# Patient Record
Sex: FEMALE | Race: NATIVE HAWAIIAN OR OTHER PACIFIC ISLANDER | NOT HISPANIC OR LATINO | Employment: PART TIME | URBAN - METROPOLITAN AREA
[De-identification: names, ages, dates, MRNs, and addresses within clinical notes are randomized per-mention and may not be internally consistent; named-entity substitution may affect disease eponyms.]

---

## 2019-08-11 DIAGNOSIS — M79.671 ACUTE PAIN OF RIGHT FOOT: Primary | ICD-10-CM

## 2019-08-13 ENCOUNTER — HOSPITAL ENCOUNTER (OUTPATIENT)
Dept: RADIOLOGY | Facility: HOSPITAL | Age: 59
Discharge: HOME | End: 2019-08-13
Attending: PODIATRIST
Payer: COMMERCIAL

## 2019-08-13 DIAGNOSIS — M79.671 ACUTE PAIN OF RIGHT FOOT: ICD-10-CM

## 2019-08-13 PROCEDURE — 73630 X-RAY EXAM OF FOOT: CPT

## 2019-09-26 ENCOUNTER — OFFICE VISIT (OUTPATIENT)
Dept: PODIATRY | Facility: CLINIC | Age: 59
End: 2019-09-26
Payer: COMMERCIAL

## 2019-09-26 VITALS
WEIGHT: 152.8 LBS | BODY MASS INDEX: 26.09 KG/M2 | DIASTOLIC BLOOD PRESSURE: 82 MMHG | SYSTOLIC BLOOD PRESSURE: 129 MMHG | HEIGHT: 64 IN | HEART RATE: 65 BPM

## 2019-09-26 DIAGNOSIS — B35.3 TINEA PEDIS OF RIGHT FOOT: ICD-10-CM

## 2019-09-26 DIAGNOSIS — M24.874 OTHER SPECIFIC JOINT DERANGEMENTS OF RIGHT FOOT, NOT ELSEWHERE CLASSIFIED: Primary | ICD-10-CM

## 2019-09-26 DIAGNOSIS — M25.571 SINUS TARSI SYNDROME, RIGHT: ICD-10-CM

## 2019-09-26 PROCEDURE — 99203 OFFICE O/P NEW LOW 30 MIN: CPT | Performed by: PODIATRIST

## 2019-09-26 PROCEDURE — 20600 DRAIN/INJ JOINT/BURSA W/O US: CPT | Performed by: PODIATRIST

## 2019-09-26 RX ORDER — CHLORAL HYDRATE 500 MG
2 CAPSULE ORAL 2 TIMES DAILY
COMMUNITY

## 2019-09-26 RX ORDER — LIDOCAINE HYDROCHLORIDE 10 MG/ML
1 INJECTION, SOLUTION INFILTRATION; PERINEURAL
Status: SHIPPED | OUTPATIENT
Start: 2019-09-26

## 2019-09-26 RX ORDER — FLUTICASONE PROPIONATE 50 MCG
2 SPRAY, SUSPENSION (ML) NASAL DAILY
COMMUNITY
Start: 2019-06-03

## 2019-09-26 RX ORDER — TRIAMCINOLONE ACETONIDE 40 MG/ML
20 INJECTION, SUSPENSION INTRA-ARTICULAR; INTRAMUSCULAR ONCE
Status: COMPLETED | OUTPATIENT
Start: 2019-09-26 | End: 2019-09-26

## 2019-09-26 RX ORDER — KETOCONAZOLE 20 MG/G
CREAM TOPICAL DAILY
Qty: 60 G | Refills: 3 | Status: SHIPPED | OUTPATIENT
Start: 2019-09-26

## 2019-09-26 RX ORDER — LIDOCAINE HYDROCHLORIDE 10 MG/ML
2 INJECTION, SOLUTION EPIDURAL; INFILTRATION; INTRACAUDAL; PERINEURAL ONCE
Status: COMPLETED | OUTPATIENT
Start: 2019-09-26 | End: 2019-09-26

## 2019-09-26 RX ORDER — TRIAMCINOLONE ACETONIDE 40 MG/ML
20 INJECTION, SUSPENSION INTRA-ARTICULAR; INTRAMUSCULAR
Status: SHIPPED | OUTPATIENT
Start: 2019-09-26

## 2019-09-26 RX ORDER — ALPRAZOLAM 0.25 MG/1
0.25 TABLET ORAL 3 TIMES DAILY PRN
COMMUNITY
Start: 2019-09-26 | End: 2020-03-24

## 2019-09-26 RX ADMIN — TRIAMCINOLONE ACETONIDE 20 MG: 40 INJECTION, SUSPENSION INTRA-ARTICULAR; INTRAMUSCULAR at 17:13

## 2019-09-26 RX ADMIN — LIDOCAINE HYDROCHLORIDE 2 ML: 10 INJECTION, SOLUTION EPIDURAL; INFILTRATION; INTRACAUDAL; PERINEURAL at 17:17

## 2019-09-26 RX ADMIN — LIDOCAINE HYDROCHLORIDE 1 ML: 10 INJECTION, SOLUTION INFILTRATION; PERINEURAL at 17:13

## 2019-09-26 RX ADMIN — TRIAMCINOLONE ACETONIDE 20 MG: 40 INJECTION, SUSPENSION INTRA-ARTICULAR; INTRAMUSCULAR at 17:17

## 2019-09-26 NOTE — PROGRESS NOTES
PATIENT:  Tasneem Khan  1960       ASSESSMENT:     1  Other specific joint derangements of right foot, not elsewhere classified  lidocaine (PF) (XYLOCAINE-MPF) 1 % injection 2 mL    triamcinolone acetonide (KENALOG-40) 40 mg/mL injection 20 mg    Small joint arthrocentesis: R subtalar   2  Sinus tarsi syndrome, right  Small joint arthrocentesis: R subtalar   3  Tinea pedis of right foot  ketoconazole (NIZORAL) 2 % cream             PLAN:  Patient was counseled and educated on the condition and the diagnosis  The exam and symptoms are consistent with sinus tarsi syndrome  The diagnosis, treatment options and prognosis were discussed with the patient  Treatment options were discussed and the patient wished to proceed with an injection  Injection was given as in the procedure  Instructed supportive care, icing, and resting  Will try antifungal therapy for right foot lesion instead of steroid  Instructed proper skin care  Small joint arthrocentesis: R subtalar  Date/Time: 9/26/2019 5:13 PM  Consent given by: patient  Site marked: site marked  Timeout: Immediately prior to procedure a time out was called to verify the correct patient, procedure, equipment, support staff and site/side marked as required   Supporting Documentation  Indications: pain   Procedure Details  Location: foot - R subtalar  Needle size: 25 G  Ultrasound guidance: no  Approach: lateral  Medications administered: 20 mg triamcinolone acetonide 40 mg/mL; 1 mL lidocaine 1 %    Patient tolerance: patient tolerated the procedure well with no immediate complications        Subjective:       HPI  The patient presents with chief complaint of right foot pain  She rolled her right foot for about 3 month ago  Pain has been little worse since the onset  Pain increases with walking and standing  The patient tried OTC meds, and different shoes without relieving the pain  Denied any swelling    No associated numbness or paresthesia  No significant weakness  She also has skin lesion on right plantar foot with some itching  She was seen by a dermatologist and topical steroid was prescribed  The following portions of the patient's history were reviewed and updated as appropriate: allergies, current medications, past family history, past medical history, past social history, past surgical history and problem list   All pertinent labs and images were reviewed  Past Medical History  History reviewed  No pertinent past medical history  Past Surgical History  History reviewed  No pertinent surgical history  Allergies:  Patient has no known allergies  Medications:  Current Outpatient Medications   Medication Sig Dispense Refill    ALPRAZolam (XANAX) 0 25 mg tablet Take 0 25 mg by mouth Three times daily as needed      Calcium Carb-Ergocalciferol 250-125 MG-UNIT TABS Take 1 tablet by mouth daily      fluticasone (FLONASE) 50 mcg/act nasal spray 2 sprays into each nostril daily      Omega-3 Fatty Acids (FISH OIL) 1,000 mg Take 2 g by mouth 2 (two) times a day      Propylene Glycol-Glycerin 1-0 3 % SOLN ONE DROP IN BOTH EYES 4 TIMES DAILY AS NEEDED   ketoconazole (NIZORAL) 2 % cream Apply topically daily 60 g 3     No current facility-administered medications for this visit          Social History:  Social History     Socioeconomic History    Marital status: /Civil Union     Spouse name: None    Number of children: None    Years of education: None    Highest education level: None   Occupational History    None   Social Needs    Financial resource strain: None    Food insecurity:     Worry: None     Inability: None    Transportation needs:     Medical: None     Non-medical: None   Tobacco Use    Smoking status: Never Smoker    Smokeless tobacco: Never Used   Substance and Sexual Activity    Alcohol use: None    Drug use: None    Sexual activity: None   Lifestyle    Physical activity: Days per week: None     Minutes per session: None    Stress: None   Relationships    Social connections:     Talks on phone: None     Gets together: None     Attends [employer] service: None     Active member of club or organization: None     Attends meetings of clubs or organizations: None     Relationship status: None    Intimate partner violence:     Fear of current or ex partner: None     Emotionally abused: None     Physically abused: None     Forced sexual activity: None   Other Topics Concern    None   Social History Narrative    None          Review of Systems   Constitutional: Negative for chills and fever  Respiratory: Negative for cough and shortness of breath  Cardiovascular: Negative for chest pain and leg swelling  Gastrointestinal: Negative for nausea and vomiting  Musculoskeletal: Negative for joint swelling  Skin: Negative for wound  Neurological: Negative for weakness and numbness  Psychiatric/Behavioral: Negative for behavioral problems  Objective:      /82   Pulse 65   Ht 5' 3 78" (1 62 m)   Wt 69 3 kg (152 lb 12 8 oz)   BMI 26 41 kg/m²          Physical Exam   Constitutional: She is oriented to person, place, and time  She appears well-developed and well-nourished  No distress  HENT:   Head: Normocephalic and atraumatic  Neck: Normal range of motion  Neck supple  Cardiovascular: Normal rate, regular rhythm and intact distal pulses  Pulmonary/Chest: Effort normal and breath sounds normal  No respiratory distress  Musculoskeletal:   Focal pain at right sinus tarsi with palpation  No acute edema  No redness  Neurological: She is alert and oriented to person, place, and time  She displays normal reflexes  No sensory deficit  Skin: Capillary refill takes less than 2 seconds  No pallor  Dry patch with skin excoriation and rashes on right arch  Psychiatric: She has a normal mood and affect  Her behavior is normal    Vitals reviewed

## 2021-06-01 ENCOUNTER — OFFICE VISIT (OUTPATIENT)
Dept: PODIATRY | Facility: CLINIC | Age: 61
End: 2021-06-01
Payer: COMMERCIAL

## 2021-06-01 VITALS
HEART RATE: 66 BPM | BODY MASS INDEX: 26.44 KG/M2 | DIASTOLIC BLOOD PRESSURE: 84 MMHG | WEIGHT: 153 LBS | SYSTOLIC BLOOD PRESSURE: 146 MMHG

## 2021-06-01 DIAGNOSIS — M24.875 OTHER SPECIFIC JOINT DERANGEMENTS LEFT FOOT, NOT ELSEWHERE CLASSIFIED: Primary | ICD-10-CM

## 2021-06-01 DIAGNOSIS — M25.572 SINUS TARSI SYNDROME OF LEFT FOOT: ICD-10-CM

## 2021-06-01 PROCEDURE — 20600 DRAIN/INJ JOINT/BURSA W/O US: CPT | Performed by: PODIATRIST

## 2021-06-01 PROCEDURE — 99203 OFFICE O/P NEW LOW 30 MIN: CPT | Performed by: PODIATRIST

## 2021-06-01 RX ORDER — IBUPROFEN 800 MG/1
800 TABLET ORAL EVERY 6 HOURS PRN
COMMUNITY
Start: 2020-10-06 | End: 2021-10-06

## 2021-06-01 RX ORDER — DICYCLOMINE HYDROCHLORIDE 10 MG/1
10 CAPSULE ORAL 3 TIMES DAILY PRN
COMMUNITY
Start: 2021-03-16

## 2021-06-01 RX ORDER — TRIAMCINOLONE ACETONIDE 40 MG/ML
20 INJECTION, SUSPENSION INTRA-ARTICULAR; INTRAMUSCULAR
Status: SHIPPED | OUTPATIENT
Start: 2021-06-01

## 2021-06-01 RX ORDER — GABAPENTIN 300 MG/1
CAPSULE ORAL
COMMUNITY
Start: 2021-05-27

## 2021-06-01 RX ORDER — CHOLECALCIFEROL (VITAMIN D3) 125 MCG
5000 CAPSULE ORAL DAILY
COMMUNITY
Start: 2021-05-27

## 2021-06-01 RX ORDER — LEVOTHYROXINE SODIUM 0.1 MG/1
100 TABLET ORAL DAILY
COMMUNITY
Start: 2021-03-05

## 2021-06-01 RX ORDER — LIDOCAINE HYDROCHLORIDE 10 MG/ML
2 INJECTION, SOLUTION INFILTRATION; PERINEURAL
Status: SHIPPED | OUTPATIENT
Start: 2021-06-01

## 2021-06-01 RX ORDER — TRAMADOL HYDROCHLORIDE 50 MG/1
TABLET ORAL
COMMUNITY
Start: 2021-03-16

## 2021-06-01 RX ORDER — BLACK COHOSH ROOT 200 MG
1000 CAPSULE ORAL DAILY
COMMUNITY
Start: 2021-05-27

## 2021-06-01 RX ORDER — VITAMIN B COMPLEX
1 CAPSULE ORAL DAILY
COMMUNITY
Start: 2021-05-27

## 2021-06-01 RX ORDER — METHYLPREDNISOLONE 4 MG/1
TABLET ORAL
COMMUNITY
Start: 2021-05-27

## 2021-06-01 RX ORDER — HYDROXYZINE HYDROCHLORIDE 10 MG/1
TABLET, FILM COATED ORAL
COMMUNITY
Start: 2021-05-27

## 2021-06-01 RX ADMIN — LIDOCAINE HYDROCHLORIDE 2 ML: 10 INJECTION, SOLUTION INFILTRATION; PERINEURAL at 09:07

## 2021-06-01 RX ADMIN — TRIAMCINOLONE ACETONIDE 20 MG: 40 INJECTION, SUSPENSION INTRA-ARTICULAR; INTRAMUSCULAR at 09:07

## 2021-06-01 NOTE — PROGRESS NOTES
PATIENT:  Rey Ordonez  1960       ASSESSMENT:     1  Other specific joint derangements left foot, not elsewhere classified  Small joint arthrocentesis: L subtalar   2  Sinus tarsi syndrome of left foot               PLAN:  1  Patient was counseled and educated on the condition and the diagnosis  2  The diagnosis, treatment options and prognosis were discussed with the patient  The exam and symptoms are consistent with sinus tarsi syndrome  3  Treatment options were discussed and the patient wished to proceed with an injection  Injection was given as in the procedure  Instructed supportive care, icing, and resting  Consider further diagnostics depending on the progress  4  Instructed supportive care, home exercise, icing, and proper footwear/ arch support  Small joint arthrocentesis: L subtalar  Universal Protocol:  Consent: Verbal consent obtained  Risks and benefits: risks, benefits and alternatives were discussed  Consent given by: patient  Time out: Immediately prior to procedure a "time out" was called to verify the correct patient, procedure, equipment, support staff and site/side marked as required  Timeout called at: 6/1/2021 9:07 AM   Patient understanding: patient states understanding of the procedure being performed  Site marked: the operative site was marked  Patient identity confirmed: verbally with patient    Supporting Documentation  Indications: pain   Procedure Details  Location: foot - L subtalar  Needle size: 25 G  Ultrasound guidance: no  Approach: lateral  Medications administered: 20 mg triamcinolone acetonide 40 mg/mL; 2 mL lidocaine 1 %    Patient tolerance: patient tolerated the procedure well with no immediate complications          Subjective:       HPI  The patient presents with chief complaint of left foot pain for several weeks  Pain is intermittent  Increased pain with walking and standing  The patient does not recall any injury    The patient tried different shoes without relieving the pain  Denied any swelling  No associated numbness or paresthesia  No significant weakness  The following portions of the patient's history were reviewed and updated as appropriate: allergies, current medications, past family history, past medical history, past social history, past surgical history and problem list   All pertinent labs and images were reviewed  Past Medical History  Past Medical History:   Diagnosis Date    Thyroid cancer [facility])        Past Surgical History  Past Surgical History:   Procedure Laterality Date    THYROID SURGERY  2021        Allergies:  Patient has no known allergies      Medications:  Current Outpatient Medications   Medication Sig Dispense Refill    B Complex CAPS Take 1 capsule by mouth daily      dicyclomine (BENTYL) 10 mg capsule Take 10 mg by mouth Three times daily as needed      gabapentin (NEURONTIN) 300 mg capsule take 1 capsule by mouth NIGHTLY      hydrocortisone 2 5 % ointment       hydrOXYzine HCL (ATARAX) 10 mg tablet TAKE 1 TABLET 3 TIMES A DAY BY MOUTH AS NEEDED FOR ITCHING      ibuprofen (MOTRIN) 800 mg tablet Take 800 mg by mouth every 6 (six) hours as needed      levothyroxine 100 mcg tablet Take 100 mcg by mouth daily      methylPREDNISolone 4 MG tablet therapy pack use as directed FOLLOW DIRECTIONS ON BACK OF FOIL PACK      RA Vitamin C/Nita Hips 1000 MG tablet Take 1,000 mg by mouth daily      RA Vitamin D-3 125 MCG (5000 UT) capsule Take 5,000 Units by mouth daily      traMADol (ULTRAM) 50 mg tablet take 1 tablet by mouth every 6 hours if needed MODERATE PAIN ( PAIN SCALE 4-6 )      ALPRAZolam (XANAX) 0 25 mg tablet Take 0 25 mg by mouth Three times daily as needed      Calcium Carb-Ergocalciferol 250-125 MG-UNIT TABS Take 1 tablet by mouth daily      fluticasone (FLONASE) 50 mcg/act nasal spray 2 sprays into each nostril daily      ketoconazole (NIZORAL) 2 % cream Apply topically daily (Patient not taking: Reported on 6/1/2021) 60 g 3    Omega-3 Fatty Acids (FISH OIL) 1,000 mg Take 2 g by mouth 2 (two) times a day      Propylene Glycol-Glycerin 1-0 3 % SOLN ONE DROP IN BOTH EYES 4 TIMES DAILY AS NEEDED  Current Facility-Administered Medications   Medication Dose Route Frequency Provider Last Rate Last Admin    lidocaine (XYLOCAINE) 1 % injection 1 mL  1 mL Injection  Bhavana Brunner DPM   1 mL at 09/26/19 1713    triamcinolone acetonide (KENALOG-40) 40 mg/mL injection 20 mg  20 mg Intra-articular  Bhavana Brunner DPM   20 mg at 09/26/19 1713       Social History:  Social History     Socioeconomic History    Marital status: /Civil Union     Spouse name: None    Number of children: None    Years of education: None    Highest education level: None   Occupational History    None   Social Needs    Financial resource strain: None    Food insecurity     Worry: None     Inability: None    Transportation needs     Medical: None     Non-medical: None   Tobacco Use    Smoking status: Never Smoker    Smokeless tobacco: Never Used   Substance and Sexual Activity    Alcohol use: None    Drug use: None    Sexual activity: None   Lifestyle    Physical activity     Days per week: None     Minutes per session: None    Stress: None   Relationships    Social connections     Talks on phone: None     Gets together: None     Attends [employer] service: None     Active member of club or organization: None     Attends meetings of clubs or organizations: None     Relationship status: None    Intimate partner violence     Fear of current or ex partner: None     Emotionally abused: None     Physically abused: None     Forced sexual activity: None   Other Topics Concern    None   Social History Narrative    None          Review of Systems   Constitutional: Negative for appetite change, chills and fever  HENT: Negative for sore throat  Respiratory: Negative for cough and shortness of breath  Cardiovascular: Negative  Gastrointestinal: Negative  Musculoskeletal: Negative for joint swelling  Skin: Negative for rash and wound  Allergic/Immunologic: Negative for immunocompromised state  Neurological: Negative for weakness and numbness  Hematological: Negative  Psychiatric/Behavioral: Negative for behavioral problems and confusion  Objective:      /84   Pulse 66   Wt 69 4 kg (153 lb)   BMI 26 44 kg/m²          Physical Exam  Vitals signs reviewed  Constitutional:       General: She is not in acute distress  Appearance: Normal appearance  She is not ill-appearing  HENT:      Head: Normocephalic and atraumatic  Neck:      Musculoskeletal: Normal range of motion and neck supple  Cardiovascular:      Rate and Rhythm: Normal rate and regular rhythm  Pulses: Normal pulses  Dorsalis pedis pulses are 2+ on the right side and 2+ on the left side  Posterior tibial pulses are 2+ on the right side and 2+ on the left side  Pulmonary:      Effort: Pulmonary effort is normal  No respiratory distress  Musculoskeletal: Normal range of motion  General: Tenderness present  No swelling, deformity or signs of injury  Right lower leg: No edema  Right foot: No foot drop  Left foot: No foot drop  Comments: Focal pain left sinus tarsi with palpation and ROM  Skin:     General: Skin is warm  Capillary Refill: Capillary refill takes less than 2 seconds  Coloration: Skin is not cyanotic or mottled  Findings: No abscess, ecchymosis, erythema, rash or wound  Nails: There is no clubbing  Neurological:      General: No focal deficit present  Mental Status: She is alert and oriented to person, place, and time  Cranial Nerves: No cranial nerve deficit  Sensory: No sensory deficit  Motor: No weakness        Coordination: Coordination normal    Psychiatric:         Mood and Affect: Mood normal  Behavior: Behavior normal          Thought Content:  Thought content normal          Judgment: Judgment normal

## 2024-10-04 ENCOUNTER — OFFICE VISIT (OUTPATIENT)
Dept: PODIATRY | Facility: CLINIC | Age: 64
End: 2024-10-04
Payer: MEDICARE

## 2024-10-04 VITALS
BODY MASS INDEX: 28.17 KG/M2 | WEIGHT: 159 LBS | DIASTOLIC BLOOD PRESSURE: 93 MMHG | SYSTOLIC BLOOD PRESSURE: 159 MMHG | HEIGHT: 63 IN | HEART RATE: 73 BPM

## 2024-10-04 DIAGNOSIS — M79.672 LEFT FOOT PAIN: ICD-10-CM

## 2024-10-04 DIAGNOSIS — M72.2 PLANTAR FASCIITIS: Primary | ICD-10-CM

## 2024-10-04 PROCEDURE — 99203 OFFICE O/P NEW LOW 30 MIN: CPT | Performed by: PODIATRIST

## 2024-10-04 NOTE — PROGRESS NOTES
PATIENT:  Robert Corado  1960         ASSESSMENT:     1. Plantar fasciitis  Ambulatory referral to Physical Therapy      2. Left foot pain                PLAN:  1. Reviewed medical records.  Patient was counseled and educated on the condition and the diagnosis.    2. The diagnosis, treatment options and prognosis were discussed with the patient.  The exam and symptoms are consistent with plantar fasciitis.      3. Referred her to PT.  Instructed supportive care, icing, and resting.  Consider further diagnostics depending on the progress.    4. Possible injection if her pain does not improve.      Procedures    Subjective:       HPI  The patient presents with chief complaint of left heel pain for 6 months.  Pain comes and goes.  She has significant post-static dyskinesia especially in the morning.  The patient does not recall any injury.  The patient tried different shoes without relieving the pain.  Denied any swelling.  No associated numbness or paresthesia.  No significant weakness.         The following portions of the patient's history were reviewed and updated as appropriate: allergies, current medications, past family history, past medical history, past social history, past surgical history and problem list.  All pertinent labs and images were reviewed.      Past Medical History  Past Medical History:   Diagnosis Date    Thyroid cancer (HCC)        Past Surgical History  Past Surgical History:   Procedure Laterality Date    THYROID SURGERY  2021    US GUIDED THYROID BIOPSY  1/14/2021        Allergies:  Patient has no known allergies.    Medications:  Current Outpatient Medications   Medication Sig Dispense Refill    B Complex CAPS Take 1 capsule by mouth daily      dicyclomine (BENTYL) 10 mg capsule Take 10 mg by mouth Three times daily as needed      fluticasone (FLONASE) 50 mcg/act nasal spray 2 sprays into each nostril daily      gabapentin (NEURONTIN) 300 mg capsule take 1 capsule by  mouth NIGHTLY      hydrocortisone 2.5 % ointment       hydrOXYzine HCL (ATARAX) 10 mg tablet TAKE 1 TABLET 3 TIMES A DAY BY MOUTH AS NEEDED FOR ITCHING      methylPREDNISolone 4 MG tablet therapy pack use as directed FOLLOW DIRECTIONS ON BACK OF FOIL PACK      Omega-3 Fatty Acids (FISH OIL) 1,000 mg Take 2 g by mouth 2 (two) times a day      Propylene Glycol-Glycerin 1-0.3 % SOLN ONE DROP IN BOTH EYES 4 TIMES DAILY AS NEEDED.      RA Vitamin C/Nita Hips 1000 MG tablet Take 1,000 mg by mouth daily      RA Vitamin D-3 125 MCG (5000 UT) capsule Take 5,000 Units by mouth daily      traMADol (ULTRAM) 50 mg tablet take 1 tablet by mouth every 6 hours if needed MODERATE PAIN ( PAIN SCALE 4-6 )      ALPRAZolam (XANAX) 0.25 mg tablet Take 0.25 mg by mouth Three times daily as needed      Calcium Carb-Ergocalciferol 250-125 MG-UNIT TABS Take 1 tablet by mouth daily      ibuprofen (MOTRIN) 800 mg tablet Take 800 mg by mouth every 6 (six) hours as needed      ketoconazole (NIZORAL) 2 % cream Apply topically daily (Patient not taking: Reported on 6/1/2021) 60 g 3    levothyroxine 100 mcg tablet Take 100 mcg by mouth daily       Current Facility-Administered Medications   Medication Dose Route Frequency Provider Last Rate Last Admin    lidocaine (XYLOCAINE) 1 % injection 1 mL  1 mL Injection  Jhon Mcclellan DPM   1 mL at 09/26/19 1713    lidocaine (XYLOCAINE) 1 % injection 2 mL  2 mL Injection  Jhon Mcclellan DPM   2 mL at 06/01/21 0907    triamcinolone acetonide (KENALOG-40) 40 mg/mL injection 20 mg  20 mg Intra-articular  Jhon Mcclellan DPM   20 mg at 09/26/19 1713    triamcinolone acetonide (KENALOG-40) 40 mg/mL injection 20 mg  20 mg Intra-articular  Jhon Mcclellan DPM   20 mg at 06/01/21 0907       Social History:  Social History     Socioeconomic History    Marital status: /Civil Union     Spouse name: None    Number of children: None    Years of education: None    Highest education level: None   Occupational History    None   Tobacco  "Use    Smoking status: Never    Smokeless tobacco: Never   Vaping Use    Vaping status: Never Used   Substance and Sexual Activity    Alcohol use: None    Drug use: None    Sexual activity: None   Other Topics Concern    None   Social History Narrative    None     Social Determinants of Health     Financial Resource Strain: Not on file   Food Insecurity: Not on file   Transportation Needs: Not on file   Physical Activity: Not on file   Stress: Not on file   Social Connections: Not on file   Intimate Partner Violence: Not on file   Housing Stability: Not on file          Review of Systems   Constitutional:  Negative for appetite change, chills and fever.   HENT:  Negative for sore throat.    Respiratory:  Negative for cough and shortness of breath.    Cardiovascular: Negative.    Gastrointestinal: Negative.    Musculoskeletal:  Negative for joint swelling.   Skin:  Negative for rash and wound.   Allergic/Immunologic: Negative for immunocompromised state.   Neurological:  Negative for weakness and numbness.   Hematological: Negative.    Psychiatric/Behavioral:  Negative for behavioral problems and confusion.          Objective:      /93   Pulse 73   Ht 5' 3\" (1.6 m)   Wt 72.1 kg (159 lb)   BMI 28.17 kg/m²          Physical Exam  Vitals reviewed.   Constitutional:       General: She is not in acute distress.     Appearance: Normal appearance. She is not ill-appearing.   HENT:      Head: Normocephalic and atraumatic.   Eyes:      Extraocular Movements: Extraocular movements intact.   Cardiovascular:      Rate and Rhythm: Normal rate and regular rhythm.      Pulses: Normal pulses.           Dorsalis pedis pulses are 2+ on the right side and 2+ on the left side.        Posterior tibial pulses are 2+ on the right side and 2+ on the left side.   Pulmonary:      Effort: Pulmonary effort is normal. No respiratory distress.   Musculoskeletal:         General: Tenderness present. No swelling, deformity or signs of " injury. Normal range of motion.      Cervical back: Normal range of motion and neck supple.      Right lower leg: No edema.      Right foot: No foot drop.      Left foot: No foot drop.      Comments: Focal pain left plantar heel.  No swelling.  No Tinel signs.   Skin:     General: Skin is warm.      Capillary Refill: Capillary refill takes less than 2 seconds.      Coloration: Skin is not cyanotic or mottled.      Findings: No abscess, ecchymosis, erythema, rash or wound.      Nails: There is no clubbing.   Neurological:      General: No focal deficit present.      Mental Status: She is alert and oriented to person, place, and time.      Cranial Nerves: No cranial nerve deficit.      Sensory: No sensory deficit.      Motor: No weakness.      Coordination: Coordination normal.   Psychiatric:         Mood and Affect: Mood normal.         Behavior: Behavior normal.         Thought Content: Thought content normal.         Judgment: Judgment normal.

## 2024-10-04 NOTE — PATIENT INSTRUCTIONS
"Patient Education     Plantar fasciitis   The Basics   Written by the doctors and editors at [facility]   What is plantar fasciitis? -- This is when a part of the foot called the \"plantar fascia\" gets irritated. The plantar fascia is a tough band of tissue that connects the heel bone to the toes (figure 1). Plantar fasciitis causes pain in the heel and bottom of the foot.  Plantar fasciitis is very common. It often affects people who run, jump, or stand for long periods. Most people get better within a year even without treatment.  What are the symptoms of plantar fasciitis? -- The most common symptom is pain under the heel and sole (bottom) of the foot.  The pain is often worst when you first get out of bed in the morning. It can also be bad when you get up after being seated for some time.  What can I do own my own to feel better? -- You can:   Rest - Rest your foot to help it heal. But don't completely stop being active. Doing that can lead to more pain and stiffness in the long run.   Ice your foot - Putting ice on your heel for 20 minutes up to 4 times a day might relieve pain. Put a thin towel between the ice and your skin. Icing and massaging your foot before exercise might also help.   Do special foot exercises - Certain exercises can help with heel pain (figure 2 and figure 3 and figure 4 and figure 5). Do these exercises every day.   Take pain medicines - If your pain is severe, you can try taking over-the-counter pain medicines. Examples include ibuprofen (sample brand names: Advil, Motrin) and naproxen (sample brand name: Aleve). But if you have other medical conditions or already take other medicines, ask your doctor or nurse before taking new pain medicines.   Wear sturdy shoes - Sneakers with a lot of cushion and good arch and heel support are best. Shoes with rigid soles can also help. Adding padded or gel heel inserts to your shoes might help, too.   Wear splints at night - Some people feel better if " "they wear a splint while they sleep that keeps their foot straight. These splints are sold in pharmacies and medical supply stores.  Is there a test for plantar fasciitis? -- No. But your doctor or nurse should be able to tell if you have it by learning about your symptoms and doing an exam. They might suggest an X-ray, or other tests to check whether your symptoms might be caused by something else.  How is plantar fasciitis treated? -- The first step is to try the things you can do on your own. If you do not get better, or your symptoms are severe, your doctor or nurse might suggest:   Taping up your foot in a special way that helps the support the foot (picture 1)   Special shoe inserts made to fit your foot   Shots (that go into your foot) of a medicine called a steroid, which can help with the pain   Putting a splint over your foot and ankle   Surgery (this is only an option for some people who do not get better with other treatments)  Some doctors also suggest a treatment called \"shock wave therapy.\" This treatment is painful and has not been proven to work.  How can I prevent getting heel pain again? -- To reduce the chances that your pain will come back:   Wear shoes that fit well, have a lot of cushion, and support your heel and ankle.   Do not wear slippers, flip-flops, slip-ons, or poorly fitted shoes.   Do not go barefoot.   Do not wear worn-out shoes.  All topics are updated as new evidence becomes available and our peer review process is complete.  This topic retrieved from [employer] on: Mar 31, 2024.  Topic 19195 Version 13.0  Release: 32.2.4 - C32.89  © 2024 UpToDate, Inc. and/or its affiliates. All rights reserved.  figure 1: Plantar fasciitis     The plantar fascia is a tough band of tissue that connects the heel bone to the toes.  Graphic 78392 Version 8.0  figure 2: Heel raises     [place] a step or book, with your heels hanging off of the edge. Hold onto a counter,chair, or handrail to help with " balance. Raise up onto your toes, and slowlylower your heel. Start with both feet at the same time. Then, when you can, do 1leg at a time. You can also place a towel under your toes. Repeat 10 to 15times, 1 to 3 times each day.  Graphic 051896 Version 1.0  figure 3: Seated calf stretch     Sitin a chair. Bend 1 leg, and rest the outside of your foot on your other knee.Grab your foot, and pull your toes and foot toward your knee until you feel astretch along the bottom of your foot. Repeat with your other foot. Repeat thisstretch 2 to 3 times, 1 to 3 times each day.  Graphic 311234 Version 1.0  figure 4: Ankle circles     Rest your leg on something so your calf is supported and your foot is in the air.  (A) Point and flex your foot a few times.  (B) Then, make a few circles with your foot by rotating your ankle.  Make a few ankle circles going in the other direction. Repeatthis stretch 2 to 3 times, 1 to 3 times each day.  Graphic 54929 Version 2.0  figure 5: Toe curls     Curl your toes around the edge of a book. Then, straighten them. Repeat over and over for 2 minutes, 2 times each day.  Graphic 73581 Version 3.0  picture 1: Foot taping for plantar fasciitis     Taping the foot like this sometimes helps with plantar fasciitis. You can use sports tape, available at pharmacies.  Step 1: Wrap a strip of tape around the foot, at the level of the ball of the foot.  Step 2: Wrap a second strip of tape around the heel, starting just below the pinky toe, around the sides of the heel, and back up to the first strip of tape.  Step 3: Wrap a third strip of tape around the heel, starting just below the pinky toe, like you did in step 2. This time, [place] the heel and wrap the tape in a crisscross, so that it ends just below the big toe.  Step 4: Repeat step 3. The tape does not need to align perfectly. The tape can stay in place for 1 week.  Graphic 21898 Version 5.0  Consumer Information Use and Disclaimer   Disclaimer: This  generalized information is a limited summary of diagnosis, treatment, and/or medication information. It is not meant to be comprehensive and should be used as a tool to help the user understand and/or assess potential diagnostic and treatment options. It does NOT include all information about conditions, treatments, medications, side effects, or risks that may apply to a specific patient. It is not intended to be medical advice or a substitute for the medical advice, diagnosis, or treatment of a health care provider based on the health care provider's examination and assessment of a patient's specific and unique circumstances. Patients must speak with a health care provider for complete information about their health, medical questions, and treatment options, including any risks or benefits regarding use of medications. This information does not endorse any treatments or medications as safe, effective, or approved for treating a specific patient. UpToDate, Inc. and its affiliates disclaim any warranty or liability relating to this information or the use thereof.The use of this information is governed by the Terms of Use, available at https://www.wolterskluwer.com/en/know/clinical-effectiveness-terms. 2024© UpToDate, Inc. and its affiliates and/or licensors. All rights reserved.  Copyright   © 2024 UpToDate, Inc. and/or its affiliates. All rights reserved.

## 2024-10-21 ENCOUNTER — EVALUATION (OUTPATIENT)
Dept: PHYSICAL THERAPY | Facility: REHABILITATION | Age: 64
End: 2024-10-21
Payer: MEDICARE

## 2024-10-21 DIAGNOSIS — M72.2 PLANTAR FASCIITIS: Primary | ICD-10-CM

## 2024-10-21 PROCEDURE — 97110 THERAPEUTIC EXERCISES: CPT | Performed by: PHYSICAL THERAPIST

## 2024-10-21 PROCEDURE — 97161 PT EVAL LOW COMPLEX 20 MIN: CPT | Performed by: PHYSICAL THERAPIST

## 2024-10-21 NOTE — PROGRESS NOTES
PT Evaluation     Today's date: 10/21/2024  Patient name: Robert Corado  : 1960  MRN: [id number]  Referring provider: Jhon Mcclellan DPM  Dx:   Encounter Diagnosis     ICD-10-CM    1. Plantar fasciitis  M72.2 Ambulatory referral to Physical Therapy          Start Time: 0800  Stop Time: 0845  Total time in clinic (min): 45 minutes    Assessment  Impairments: abnormal or restricted ROM and lacks appropriate home exercise program    Assessment details: Patient is a 64 y.o. female with c/o of 6 month history of L heel pain. Patient's symptoms limit her with walking first thing in the morning and standing/walking after sitting for prolonged time. On exam patient presents with increased muscle tension of her foot intrinsic muscles but no provocation of heel pain with palpation. Patient's hx and exam is suggestive of plantar fasciitis. Patient will benefit from skilled PT to address the above impairments to progress back to prior level of function.   Understanding of Dx/Px/POC: good     Prognosis: good    Goals  Within 3 weeks,   1. Patient will report <3/10 pain when she first wakes up.   2. Patient will be independent with HEP.    Within 6 weeks or by discharge,   1. Patient will report no pain in the morning.   2. Patient will be able to ambulate for >=30 minutes without pain.  3. Patient will be able to stand for >= 30 minutes without pain.   4. Patient will be able to complete >=5 UHR on L without pain.    Plan  Patient would benefit from: skilled physical therapy  Planned modality interventions: cryotherapy and thermotherapy: hydrocollator packs    Planned therapy interventions: abdominal trunk stabilization, joint mobilization, manual therapy, activity modification, balance, balance/weight bearing training, neuromuscular re-education, body mechanics training, postural training, patient education, therapeutic activities, therapeutic exercise, functional ROM exercises, strengthening, stretching, transfer training,  gait training and home exercise program    Frequency: 1x week  Plan of Care beginning date: 10/21/2024  Plan of Care expiration date: 2024  Treatment plan discussed with: patient        Subjective Evaluation    History of Present Illness  Mechanism of injury: Patient requiring use of [place]ian [employer] .    Patient reports 6 month hx of L heel pain. Denies any WALE.    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     Passive Range of Motion   Left Ankle/Foot    Dorsiflexion (ke): 10 degrees   Plantar flexion: 35 degrees   Inversion: 40 degrees   Eversion: 20 degrees   Great toe extension: 60 degrees     Right Ankle/Foot    Dorsiflexion (ke): 10 degrees   Plantar flexion: 40 degrees   Inversion: 50 degrees   Eversion: 10 degrees   Great toe extension: 50 degrees         Access Code: [email or web address]  URL: [email or web address]  Date: 10/21/2024  Prepared by: Deanna Correa    Exercises  - Gastroc Stretch on Wall  - 2-3 x daily - 7 x weekly - 3 sets - 30 hold  - Standing Toe Dorsiflexion Stretch  - 1 x daily - 7 x weekly - 3 sets - 10 reps  - Standing Plantar Fascia Mobilization with Small Ball  - 1 x daily - 7 x weekly     POC expires Unit limit Auth Expiration date PT/OT/ST + Visit Limit?   12/2/24  10/5/25                              Visit/Unit Tracking  AUTH Status:  Date 10/21             Pending Used 1              Remaining                 Diagnosis: Plantar fasciitis   Precautions: thyroid cancer - surgery   Insurance: [employer]    10/21          Vitals     FOTO       Patient Ed           Self massage STM technique on plantar fascia          Ice Can ice/ice water bottle                                                                 Neuro Re-Ed                                                                                        Ther Ex           Aerobic conditioning           Gastroc stretch 3 x 30s L          Plantar fascia stretch Kneeling or standing  5 x 15-30s                                                                 Ther Activity                                 Manual           STM  Plantar fascia  5'                                                       Modalities

## 2024-10-28 ENCOUNTER — OFFICE VISIT (OUTPATIENT)
Dept: PHYSICAL THERAPY | Facility: REHABILITATION | Age: 64
End: 2024-10-28
Payer: MEDICARE

## 2024-10-28 DIAGNOSIS — M72.2 PLANTAR FASCIITIS: Primary | ICD-10-CM

## 2024-10-28 PROCEDURE — 97140 MANUAL THERAPY 1/> REGIONS: CPT | Performed by: PHYSICAL THERAPIST

## 2024-10-28 PROCEDURE — 97110 THERAPEUTIC EXERCISES: CPT | Performed by: PHYSICAL THERAPIST

## 2024-10-28 NOTE — PROGRESS NOTES
Daily Note     Today's date: 10/28/2024  Patient name: Robert Corado  : 1960  MRN: [id number]  Referring provider: Jhon Mcclellan DPM  Dx:   Encounter Diagnosis     ICD-10-CM    1. Plantar fasciitis  M72.2           Start Time: 0800  Stop Time: 0845  Total time in clinic (min): 45 minutes    Subjective: Reports feeling less pain since last PT session. Had pain last night that was 5/10 but overall it is less than before.       Objective: See treatment diary below      Assessment: Arabic  used during session. Patient reporting improvement in pain since beginning her stretches. Progressed to manual therapy which patient reported good response to. Patient able to complete heel raises and towel scrunches although it did challenge her. Patient exhibited good technique with therapeutic exercises and would benefit from continued PT to improve her standing and walking tolerance.       Plan: Continue per plan of care.      POC expires Unit limit Auth Expiration date PT/OT/ST + Visit Limit?   12/2/24  10/5/25                              Visit/Unit Tracking  AUTH Status:  Date 10/21 10/28            8 visits approved Used 1 2             Remaining  7 6              Diagnosis: Plantar fasciitis   Precautions: thyroid cancer - surgery   Insurance: [employer]    10/21 10/28         Vitals     FOTO      Patient Ed           Self massage STM technique on plantar fascia          Ice Can ice/ice water bottle                                                                 Neuro Re-Ed                                                                                        Ther Ex           Aerobic conditioning  Nustep  Lvl 5  8 min         Gastroc stretch 3 x 30s L 3 x 30s b/l         Plantar fascia stretch Kneeling or standing  5 x 15-30s Kneeling  3 x 30s         Towel scrunches  Seated  8 towel lengths         Heel raises  Unilateral  3 x 8 L                                          Ther Activity                                  Manual           STM  Plantar fascia  5'  PF, gastrocsoleus   10'                                                     Modalities

## 2024-11-04 ENCOUNTER — OFFICE VISIT (OUTPATIENT)
Dept: PHYSICAL THERAPY | Facility: REHABILITATION | Age: 64
End: 2024-11-04
Payer: MEDICARE

## 2024-11-04 DIAGNOSIS — M72.2 PLANTAR FASCIITIS: Primary | ICD-10-CM

## 2024-11-04 PROCEDURE — 97140 MANUAL THERAPY 1/> REGIONS: CPT | Performed by: PHYSICAL THERAPIST

## 2024-11-04 PROCEDURE — 97110 THERAPEUTIC EXERCISES: CPT | Performed by: PHYSICAL THERAPIST

## 2024-11-04 NOTE — PROGRESS NOTES
Daily Note     Today's date: 2024  Patient name: Robert Corado  : 1960  MRN: [id number]  Referring provider: Jhon Mcclellan DPM  Dx:   Encounter Diagnosis     ICD-10-CM    1. Plantar fasciitis  M72.2             Start Time: 0800  Stop Time: 0840  Total time in clinic (min): 40 minutes    Subjective: Continuing to improve. Reported didn't feel much pain last week.       Objective: See treatment diary below      Assessment: Nepali  used during session. Patient reporting minimal to no pain since last PT session. Continued her current HEP and reinforced progression to unilateral heel raises which are difficult but not painful. Patient is doing well. Will f/u in 2 weeks for possible discharge if her sx continue to be abolished.      Plan: Continue per plan of care.      POC expires Unit limit Auth Expiration date PT/OT/ST + Visit Limit?   12/2/24  10/5/25                              Visit/Unit Tracking  AUTH Status:  Date 10/21 10/28 11/4           8 visits approved Used 1 2 3            Remaining  7 6 5             Diagnosis: Plantar fasciitis   Precautions: thyroid cancer - surgery   Insurance: [employer]    10/21 10/28 11/4        Vitals           Patient Ed           Self massage STM technique on plantar fascia          Ice Can ice/ice water bottle                                                                 Neuro Re-Ed                                                                                        Ther Ex           Aerobic conditioning  Nustep  Lvl 5  8 min Nustep  Lvl 6  6 min        Gastroc stretch 3 x 30s L 3 x 30s b/l 3 x 30s b/l        Plantar fascia stretch Kneeling or standing  5 x 15-30s Kneeling  3 x 30s Kneeling  3 x 30s        Towel scrunches  Seated  8 towel lengths Standing  Short towel  2#  10 towel lengths        Heel raises  Unilateral  3 x 8 L Unilateral  3 x 10 L                                         Ther Activity                                 Manual           STM   Plantar fascia  5'  PF, gastrocsoleus   10' PF, gastrocsoleus   15'                                                    Modalities

## 2024-11-11 ENCOUNTER — APPOINTMENT (OUTPATIENT)
Dept: PHYSICAL THERAPY | Facility: REHABILITATION | Age: 64
End: 2024-11-11
Payer: MEDICARE

## 2024-11-18 ENCOUNTER — OFFICE VISIT (OUTPATIENT)
Dept: PHYSICAL THERAPY | Facility: REHABILITATION | Age: 64
End: 2024-11-18
Payer: MEDICARE

## 2024-11-18 DIAGNOSIS — M72.2 PLANTAR FASCIITIS: Primary | ICD-10-CM

## 2024-11-18 PROCEDURE — 97110 THERAPEUTIC EXERCISES: CPT | Performed by: PHYSICAL THERAPIST

## 2024-11-18 PROCEDURE — 97164 PT RE-EVAL EST PLAN CARE: CPT | Performed by: PHYSICAL THERAPIST

## 2024-11-18 NOTE — PROGRESS NOTES
PT Discharge    Today's date: 2024  Patient name: Robert Corado  : 1960  MRN: [id number]  Referring provider: Jhon Mcclellan DPM  Dx:   Encounter Diagnosis     ICD-10-CM    1. Plantar fasciitis  M72.2             Start Time: 0800  Stop Time: 0845  Total time in clinic (min): 45 minutes    Assessment    Assessment details: Patient has been seen in PT for her c/o chronic L heel pain. Patient reporting significant improvement in her sx and has been pain free for over two weeks. Objectively, patient demonstrates good improvement in her ankle mobility and has no pain with palpation of her plantar fascia. Patient has met all her PT goals. At this time patient is independent with her HEP and is ready for discharge. Patient in agreement with plan and is aware to return to PT if her status changes.     Goals  Within 3 weeks,   1. Patient will report <3/10 pain when she first wakes up. - Met  2. Patient will be independent with HEP. - Met    Within 6 weeks or by discharge,   1. Patient will report no pain in the morning. - Met  2. Patient will be able to ambulate for >=30 minutes without pain. - Met  3. Patient will be able to stand for >= 30 minutes without pain. - Met  4. Patient will be able to complete >=5 UHR on L without pain. - Met    Plan    Treatment plan discussed with: patient        Subjective Evaluation    History of Present Illness  Mechanism of injury: Interval History (24): Had a little pain after last PT session but has not felt any pain since then. Feels comfortable with her exercises.     Initial Evaluation:   Patient requiring use of [employer]ian [employer] .    Patient reports 6 month hx of L heel pain. Denies any WALE.    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  Patient Goals  Patient goals for therapy: decreased pain  Patient goal: less pain with walking and standing  Pain  Current  pain ratin    Social Support    Employment status: working (dry cleaning)        Objective     Passive Range of Motion   Left Ankle/Foot    Dorsiflexion (ke): 20 degrees   Plantar flexion: 65 degrees   Inversion: 50 degrees   Eversion: 25 degrees   Great toe extension: 80 degrees     Right Ankle/Foot    Dorsiflexion (ke): 10 degrees   Plantar flexion: 55 degrees   Inversion: 50 degrees   Eversion: 30 degrees   Great toe extension: 70 degrees     Strength/Myotome Testing     Additional Strength Details  Pain free with unilateral heel raises on L        Access Code: [email or web address]  URL: [email or web address]  Date: 10/21/2024  Prepared by: Deanna Correa    Exercises  - Gastroc Stretch on Wall  - 2-3 x daily - 7 x weekly - 3 sets - 30 hold  - Standing Toe Dorsiflexion Stretch  - 1 x daily - 7 x weekly - 3 sets - 10 reps  - Standing Plantar Fascia Mobilization with Small Ball  - 1 x daily - 7 x weekly     POC expires Unit limit Auth Expiration date PT/OT/ST + Visit Limit?   12/2/24  10/5/25                              Visit/Unit Tracking  AUTH Status:  Date 10/21 10/28 11/4 11/18          8 visits approved Used 1 2 3 4           Remaining  7 6 5 4            Diagnosis: Plantar fasciitis   Precautions: thyroid cancer - surgery   Insurance: [employer]    10/21 10/28 11/4 11/18       [facility]       Patient Ed           Self massage STM technique on plantar fascia          Ice Can ice/ice water bottle                                                                 Neuro Re-Ed                                                                                        Ther Ex           Aerobic conditioning  Nustep  Lvl 5  8 min Nustep  Lvl 6  6 min        Gastroc stretch 3 x 30s L 3 x 30s b/l 3 x 30s b/l HEP       Plantar fascia stretch Kneeling or standing  5 x 15-30s Kneeling  3 x 30s Kneeling  3 x 30s HEP       Towel scrunches  Seated  8 towel lengths Standing  Short towel  2#  10 towel lengths        Heel raises   Unilateral  3 x 8 L Unilateral  3 x 10 L HEP                                        Ther Activity                                 Manual           STM  Plantar fascia  5'  PF, gastrocsoleus   10' PF, gastrocsoleus   15'                                                    Modalities                                              pt complains of toe pain and rash along with congestion

## 2025-03-14 ENCOUNTER — OFFICE VISIT (OUTPATIENT)
Dept: URGENT CARE | Facility: CLINIC | Age: 65
End: 2025-03-14
Payer: MEDICARE

## 2025-03-14 ENCOUNTER — APPOINTMENT (OUTPATIENT)
Dept: RADIOLOGY | Facility: CLINIC | Age: 65
End: 2025-03-14
Payer: MEDICARE

## 2025-03-14 VITALS
HEART RATE: 88 BPM | DIASTOLIC BLOOD PRESSURE: 82 MMHG | OXYGEN SATURATION: 99 % | RESPIRATION RATE: 18 BRPM | TEMPERATURE: 98.7 F | SYSTOLIC BLOOD PRESSURE: 156 MMHG

## 2025-03-14 DIAGNOSIS — M25.571 ACUTE RIGHT ANKLE PAIN: ICD-10-CM

## 2025-03-14 DIAGNOSIS — S82.64XA CLOSED NONDISPLACED FRACTURE OF LATERAL MALLEOLUS OF RIGHT FIBULA, INITIAL ENCOUNTER: Primary | ICD-10-CM

## 2025-03-14 PROCEDURE — 73610 X-RAY EXAM OF ANKLE: CPT

## 2025-03-14 PROCEDURE — 99204 OFFICE O/P NEW MOD 45 MIN: CPT

## 2025-03-14 NOTE — PROGRESS NOTES
[facility]        NAME: Robert Corado is a 65 y.o. female  : 1960    MRN: [id number]  DATE: 2025  TIME: 11:06 AM    Assessment and Plan   Closed nondisplaced fracture of lateral malleolus of right fibula, initial encounter [S82.64XA]  1. Closed nondisplaced fracture of lateral malleolus of right fibula, initial encounter  XR ankle 3+ vw right    Ambulatory Referral to Orthopedic Surgery    Cam Boot      Right ankle shows fracture of the lateral malleolus of the right ankle.  Will wait for the official read from the radiologist      Patient Instructions   Ice to the affected area 4 times a day for 20 minutes  Motrin 600 mg 3 times a day with food until Monday and then as needed  Keep the right foot elevated is much as possible  Wear the boot for compression     Call the orthopedic doctor today to set up an appointment  No driving when you have the boot on  If you develop any numbness tingling or your foot turns blue go to the emergency room    Follow up with PCP in 3-5 days.  Proceed to  ER if symptoms worsen.    If tests have been performed at [facility], our office will contact you with results if changes need to be made to the care plan discussed with you at the visit.  You can review your full results on [facility] MyChart.    Chief Complaint     Chief Complaint   Patient presents with    Ankle Pain     Patient has right ankle pain that started yesterday after she twisted it while walking, denies any fall. Has trouble bearing weight          History of Present Illness       This is a 65-year-old female who presents today with swelling and pain to the right ankle.  She states that she was walking yesterday on the path and twisted her right ankle outwards.  She has not taken anything for pain but has been using ice that she does have significant swelling to the lateral right ankle.  X-ray shows possible fracture to the lateral malleolus.  Patient states she has no pain when she is not  standing pain is 8 out of 10 when she stands or walks.  I did speak with her daughter who was translating for us.  She is aware that she needs to call orthopedics and make a follow-up appointment.        Review of Systems   Review of Systems   Constitutional: Negative.    HENT: Negative.     Respiratory: Negative.     Cardiovascular: Negative.    Gastrointestinal: Negative.    Genitourinary: Negative.    Musculoskeletal:  Positive for arthralgias (R ankle).   Neurological: Negative.          Current Medications       Current Outpatient Medications:     ALPRAZolam (XANAX) 0.25 mg tablet, Take 0.25 mg by mouth Three times daily as needed, Disp: , Rfl:     B Complex CAPS, Take 1 capsule by mouth daily, Disp: , Rfl:     Calcium Carb-Ergocalciferol 250-125 MG-UNIT TABS, Take 1 tablet by mouth daily, Disp: , Rfl:     dicyclomine (BENTYL) 10 mg capsule, Take 10 mg by mouth Three times daily as needed, Disp: , Rfl:     fluticasone (FLONASE) 50 mcg/act nasal spray, 2 sprays into each nostril daily, Disp: , Rfl:     gabapentin (NEURONTIN) 300 mg capsule, take 1 capsule by mouth NIGHTLY, Disp: , Rfl:     hydrocortisone 2.5 % ointment, , Disp: , Rfl:     hydrOXYzine HCL (ATARAX) 10 mg tablet, TAKE 1 TABLET 3 TIMES A DAY BY MOUTH AS NEEDED FOR ITCHING, Disp: , Rfl:     ibuprofen (MOTRIN) 800 mg tablet, Take 800 mg by mouth every 6 (six) hours as needed, Disp: , Rfl:     ketoconazole (NIZORAL) 2 % cream, Apply topically daily (Patient not taking: Reported on 6/1/2021), Disp: 60 g, Rfl: 3    levothyroxine 100 mcg tablet, Take 100 mcg by mouth daily, Disp: , Rfl:     methylPREDNISolone 4 MG tablet therapy pack, use as directed FOLLOW DIRECTIONS ON BACK OF FOIL PACK, Disp: , Rfl:     Omega-3 Fatty Acids (FISH OIL) 1,000 mg, Take 2 g by mouth 2 (two) times a day, Disp: , Rfl:     Propylene Glycol-Glycerin 1-0.3 % SOLN, ONE DROP IN BOTH EYES 4 TIMES DAILY AS NEEDED., Disp: , Rfl:     RA Vitamin C/Nita Hips 1000 MG tablet, Take 1,000  mg by mouth daily, Disp: , Rfl:     RA Vitamin D-3 125 MCG (5000 UT) capsule, Take 5,000 Units by mouth daily, Disp: , Rfl:     traMADol (ULTRAM) 50 mg tablet, take 1 tablet by mouth every 6 hours if needed MODERATE PAIN ( PAIN SCALE 4-6 ), Disp: , Rfl:     Current Facility-Administered Medications:     lidocaine (XYLOCAINE) 1 % injection 1 mL, 1 mL, Injection, , Ferreira Eleuterio, DPM, 1 mL at 09/26/19 1713    lidocaine (XYLOCAINE) 1 % injection 2 mL, 2 mL, Injection, , Ferreira Eleuterio, DPM, 2 mL at 06/01/21 0907    triamcinolone acetonide (KENALOG-40) 40 mg/mL injection 20 mg, 20 mg, Intra-articular, , Ferreira Eleuterio, DPM, 20 mg at 09/26/19 1713    triamcinolone acetonide (KENALOG-40) 40 mg/mL injection 20 mg, 20 mg, Intra-articular, , Ferreira Eleuterio, DPM, 20 mg at 06/01/21 0907    Current Allergies     Allergies as of 03/14/2025 - Reviewed 03/14/2025   Allergen Reaction Noted    Cat dander Itching 09/19/2016            The following portions of the patient's history were reviewed and updated as appropriate: allergies, current medications, past family history, past medical history, past social history, past surgical history and problem list.     Past Medical History:   Diagnosis Date    Thyroid cancer (HCC)        Past Surgical History:   Procedure Laterality Date    THYROID SURGERY  2021    US GUIDED THYROID BIOPSY  1/14/2021       No family history on file.      Medications have been verified.        Objective   /82   Pulse 88   Temp 98.7 °F (37.1 °C)   Resp 18   SpO2 99%   No LMP recorded.       Physical Exam     Physical Exam  Vitals reviewed.   Constitutional:       General: She is not in acute distress.     Appearance: Normal appearance. She is not ill-appearing.   HENT:      Head: Normocephalic and atraumatic.   Eyes:      Extraocular Movements: Extraocular movements intact.      Conjunctiva/sclera: Conjunctivae normal.   Pulmonary:      Effort: Pulmonary effort is normal.   Musculoskeletal:        Feet:    Feet:       Comments: Patient has significant swelling to the right lateral ankle.  +2 pedal pulses good sensation to the toes no cyanosis of the nailbeds.  Patient is tender to palpation over the right lateral ankle.  Skin:     General: Skin is warm.   Neurological:      General: No focal deficit present.      Mental Status: She is alert.   Psychiatric:         Mood and Affect: Mood normal.         Behavior: Behavior normal.         Judgment: Judgment normal.

## 2025-03-14 NOTE — PATIENT INSTRUCTIONS
Ice to the affected area 4 times a day for 20 minutes  Motrin 600 mg 3 times a day with food until Monday and then as needed  Keep the right foot elevated is much as possible  Wear the boot for compression     Call the orthopedic doctor today to set up an appointment  No driving when you have the boot on  If you develop any numbness tingling or your foot turns blue go to the emergency room

## 2025-03-17 ENCOUNTER — OFFICE VISIT (OUTPATIENT)
Dept: OBGYN CLINIC | Facility: CLINIC | Age: 65
End: 2025-03-17
Payer: MEDICARE

## 2025-03-17 VITALS — HEIGHT: 63 IN | WEIGHT: 159 LBS | BODY MASS INDEX: 28.17 KG/M2

## 2025-03-17 DIAGNOSIS — S82.64XA CLOSED NONDISPLACED FRACTURE OF LATERAL MALLEOLUS OF RIGHT FIBULA, INITIAL ENCOUNTER: ICD-10-CM

## 2025-03-17 PROCEDURE — 27786 TREATMENT OF ANKLE FRACTURE: CPT | Performed by: ORTHOPAEDIC SURGERY

## 2025-03-17 PROCEDURE — 99204 OFFICE O/P NEW MOD 45 MIN: CPT | Performed by: ORTHOPAEDIC SURGERY

## 2025-03-17 NOTE — PROGRESS NOTES
Sports Medicine and Shoulder Surgery    Robert Corado, 65 y.o. female   MRN# [id number]   : 1960            CHIEF COMPLAINT  Right ankle fracture    HISTORY OF PRESENT ILLNESS  Robert Corado is a 65 y.o. female who presents for evaluation of their  right ankle. Patient twisted her ankle outwards on 3/13/25 causing immediate pain. She presented to the [facility] on 3/14/25 where she obtained an x-ray and high tide camboot. Today, she is not experiencing pain unless she walks which is described as an 8/10 PQ. Daughter translated via phone for Robert.    REVIEW OF SYSTEMS  Review of systems was performed and, outside that mentioned in the HPI, it was negative for symptomology related to the integumentary, hematologic, immunologic, allergic, neurologic, cardiovascular, respiratory, GI or  systems.    MEDICAL HISTORY  Patient Active Problem List   Diagnosis    Closed nondisplaced fracture of lateral malleolus of right fibula    Closed nondisplaced fracture of lateral malleolus of right fibula, initial encounter        SURGICAL HISTORY  Past Surgical History:   Procedure Laterality Date    THYROID SURGERY      US GUIDED THYROID BIOPSY  2021       CURRENT MEDICATIONS    Current Outpatient Medications:     B Complex CAPS, Take 1 capsule by mouth daily, Disp: , Rfl:     dicyclomine (BENTYL) 10 mg capsule, Take 10 mg by mouth Three times daily as needed, Disp: , Rfl:     fluticasone (FLONASE) 50 mcg/act nasal spray, 2 sprays into each nostril daily, Disp: , Rfl:     gabapentin (NEURONTIN) 300 mg capsule, take 1 capsule by mouth NIGHTLY, Disp: , Rfl:     hydrocortisone 2.5 % ointment, , Disp: , Rfl:     hydrOXYzine HCL (ATARAX) 10 mg tablet, TAKE 1 TABLET 3 TIMES A DAY BY MOUTH AS NEEDED FOR ITCHING, Disp: , Rfl:     levothyroxine 100 mcg tablet, Take 100 mcg by mouth daily, Disp: , Rfl:     methylPREDNISolone 4 MG tablet therapy pack, use as directed FOLLOW DIRECTIONS ON BACK OF FOIL PACK, Disp: , Rfl:      Omega-3 Fatty Acids (FISH OIL) 1,000 mg, Take 2 g by mouth 2 (two) times a day, Disp: , Rfl:     Propylene Glycol-Glycerin 1-0.3 % SOLN, ONE DROP IN BOTH EYES 4 TIMES DAILY AS NEEDED., Disp: , Rfl:     RA Vitamin C/Nita Hips 1000 MG tablet, Take 1,000 mg by mouth daily, Disp: , Rfl:     RA Vitamin D-3 125 MCG (5000 UT) capsule, Take 5,000 Units by mouth daily, Disp: , Rfl:     traMADol (ULTRAM) 50 mg tablet, take 1 tablet by mouth every 6 hours if needed MODERATE PAIN ( PAIN SCALE 4-6 ), Disp: , Rfl:     ALPRAZolam (XANAX) 0.25 mg tablet, Take 0.25 mg by mouth Three times daily as needed, Disp: , Rfl:     Calcium Carb-Ergocalciferol 250-125 MG-UNIT TABS, Take 1 tablet by mouth daily, Disp: , Rfl:     ibuprofen (MOTRIN) 800 mg tablet, Take 800 mg by mouth every 6 (six) hours as needed, Disp: , Rfl:     ketoconazole (NIZORAL) 2 % cream, Apply topically daily (Patient not taking: Reported on 3/17/2025), Disp: 60 g, Rfl: 3    Current Facility-Administered Medications:     lidocaine (XYLOCAINE) 1 % injection 1 mL, 1 mL, Injection, , Jhon Mcclellan, DPM, 1 mL at 09/26/19 1713    lidocaine (XYLOCAINE) 1 % injection 2 mL, 2 mL, Injection, , Jhon Mcclellan, DPM, 2 mL at 06/01/21 0907    triamcinolone acetonide (KENALOG-40) 40 mg/mL injection 20 mg, 20 mg, Intra-articular, , Jhon Eleuterio, DPM, 20 mg at 09/26/19 1713    triamcinolone acetonide (KENALOG-40) 40 mg/mL injection 20 mg, 20 mg, Intra-articular, , Jhon Mcclelaln, DPM, 20 mg at 06/01/21 0907    SOCIAL HISTORY  Social History     Socioeconomic History    Marital status: /Civil Union     Spouse name: Not on file    Number of children: Not on file    Years of education: Not on file    Highest education level: Not on file   Occupational History    Not on file   Tobacco Use    Smoking status: Never    Smokeless tobacco: Never   Vaping Use    Vaping status: Never Used   Substance and Sexual Activity    Alcohol use: Not on file    Drug use: Not on file    Sexual activity: Not on file  "  Other Topics Concern    Not on file   Social History Narrative    Not on file     Social Drivers of Health     Financial Resource Strain: Low Risk  (12/11/2024)    Received from [facility]    Overall Financial Resource Strain (CARDIA)     Difficulty of Paying Living Expenses: Not hard at all   Food Insecurity: No Food Insecurity (12/11/2024)    Received from [facility]    Hunger Vital Sign     Worried About Running Out of Food in the Last Year: Never true     Ran Out of Food in the Last Year: Never true   Transportation Needs: No Transportation Needs (12/11/2024)    Received from [facility]    PRAPARE - Transportation     Lack of Transportation (Medical): No     Lack of Transportation (Non-Medical): No   Physical Activity: Not on file   Stress: Not on file   Social Connections: Not on file   Intimate Partner Violence: Not At Risk (12/11/2024)    Received from [facility]    Humiliation, Afraid, Rape, and Kick questionnaire     Fear of Current or Ex-Partner: No     Emotionally Abused: No     Physically Abused: No     Sexually Abused: No   Housing Stability: Low Risk  (12/11/2024)    Received from [facility]    Housing Stability Vital Sign     Unable to Pay for Housing in the Last Year: No     Number of Times Moved in the Last Year: 0     Homeless in the Last Year: No       Objective   VITAL SIGNS  Ht 5' 3\" (1.6 m) Comment: Verbal  Wt 72.1 kg (159 lb) Comment: Verbal  BMI 28.17 kg/m²     PHYSICAL EXAMINATION  Musculoskeletal: Right Ankle   Skin Intact               Swelling/ecchymosis over lateral malleolus               TTP: Lateral malleolus   Range of motion and strength testing limited due to pain   Sensation intact throughout Superficial peroneal, Deep peroneal, Tibial, Sural, Saphenous distributions              EHL/TA/PF motor function intact to testing.               BCR              Gait: Antalgic     DIAGNOSTIC " IMAGING  Imaging studies reviewed from outside independently, demonstrate a nondisplaced distal fibular fracture, Aranda A.  Ankle mortise appears to be intact, no significant degenerative changes.  Soft tissues unremarkable    Assessment & Plan  Closed nondisplaced fracture of lateral malleolus of right fibula, initial encounter    Orders:    Ambulatory Referral to Orthopedic Surgery  -Patient and daughter denied work note         We had a lengthy discussion regarding the diagnosis, natural history, and treatment options of the patients right lateral malleolus fracture. We discussed nonoperative treatment options, and the patient elects to proceed with nonoperative management. We discussed standard of care for fracture treatment, goals and expectations. Treatment plan discussed with patient that includes immobilization in a CAM boot WBAT with crutches/walker for assistance. To wear boot for a total of 4-6 weeks. Can remove the boot when not bearing weight Return to clinic in 3 months for repeat xrays of the right ankle to evaluate fracture healing.    If any issues, questions, or concerns arise between now and the next appointment, we have encouraged the patient contact our team.    Patient voiced their understanding of this plan, and they were in agreement with it. All questions answered.      If any issues, questions, or concerns arise between now and the next appointment, we have encouraged the patient contact our team.      Fracture / Dislocation Treatment    Date/Time: 3/17/2025 7:30 AM    Performed by: Андрей Faria MD  Authorized by: Андрей Faria MD    Patient Location:  Clinic    Injury location:  Ankle  Location details:  Right ankle  Injury type:  Fracture  Fracture type: lateral malleolus    Fracture type: lateral malleolus    Neurovascular status: Neurovascularly intact    Distal perfusion: normal    Neurological function: normal    Range of motion: reduced    Immobilization:  Brace  Neurovascular  status: Neurovascularly intact    Distal perfusion: normal    Neurological function: normal    Range of motion: unchanged    Patient tolerance:  Patient tolerated the procedure well with no immediate complications      The complexity of the medical decision making, including the comprehensive history, detailed examination and moderate risk assessment, and time spent with patient supports coding at a Level 4 for this encounter       Scribe Attestation      I,:  Isaac Monk am acting as a scribe while in the presence of the attending physician.:       I,:  Андрей Faria MD personally performed the services described in this documentation    as scribed in my presence.: